# Patient Record
Sex: FEMALE | Race: BLACK OR AFRICAN AMERICAN | URBAN - METROPOLITAN AREA
[De-identification: names, ages, dates, MRNs, and addresses within clinical notes are randomized per-mention and may not be internally consistent; named-entity substitution may affect disease eponyms.]

---

## 2022-10-31 ENCOUNTER — WEB ENCOUNTER (OUTPATIENT)
Dept: URBAN - METROPOLITAN AREA CLINIC 124 | Facility: CLINIC | Age: 27
End: 2022-10-31

## 2022-10-31 ENCOUNTER — OFFICE VISIT (OUTPATIENT)
Dept: URBAN - METROPOLITAN AREA CLINIC 124 | Facility: CLINIC | Age: 27
End: 2022-10-31
Payer: COMMERCIAL

## 2022-10-31 VITALS
HEIGHT: 66 IN | BODY MASS INDEX: 26.39 KG/M2 | DIASTOLIC BLOOD PRESSURE: 66 MMHG | WEIGHT: 164.2 LBS | TEMPERATURE: 96.4 F | HEART RATE: 72 BPM | SYSTOLIC BLOOD PRESSURE: 102 MMHG

## 2022-10-31 DIAGNOSIS — K60.2 FISSURE IN ANO: ICD-10-CM

## 2022-10-31 DIAGNOSIS — K59.04 CHRONIC IDIOPATHIC CONSTIPATION: ICD-10-CM

## 2022-10-31 PROCEDURE — 99204 OFFICE O/P NEW MOD 45 MIN: CPT | Performed by: INTERNAL MEDICINE

## 2022-10-31 RX ORDER — LINACLOTIDE 145 UG/1
1 CAPSULE CAPSULE, GELATIN COATED ORAL ONCE A DAY
Qty: 90 CAPSULE | Refills: 1 | OUTPATIENT
Start: 2022-10-31 | End: 2023-04-29

## 2022-10-31 NOTE — HPI-TODAY'S VISIT:
26 yo fem ref for a GI consultation of chr constipation and a copy will be sent to the ref provider. Pt has had constipation and has had anal tears. Pt has changed her diet, drinks plenty of water, eats high fiber. Pt has tried senna tabs, metamucill, miralax, dulcolax and nothing works. Pt saw a dr at [place]  Pt does not know if she had this as a child. Pt graduated and works in law enforcement. Pt was given prescrption by ER but could not get it filled.

## 2022-11-02 LAB
A/G RATIO: 1.5
ABSOLUTE BASOPHILS: 9
ABSOLUTE EOSINOPHILS: 31
ABSOLUTE LYMPHOCYTES: 1420
ABSOLUTE MONOCYTES: 319
ABSOLUTE NEUTROPHILS: 1321
ALBUMIN: 4.1
ALKALINE PHOSPHATASE: 62
ALT (SGPT): 10
AST (SGOT): 13
BASOPHILS: 0.3
BILIRUBIN, TOTAL: 0.8
BUN/CREATININE RATIO: 9
BUN: 6
CALCIUM: 9
CARBON DIOXIDE, TOTAL: 27
CHLORIDE: 107
CREATININE: 0.7
EGFR: 123
EOSINOPHILS: 1
GLOBULIN, TOTAL: 2.7
GLUCOSE: 85
HEMATOCRIT: 37.9
HEMOGLOBIN: 12.4
HS CRP: 1.4
IMMUNOGLOBULIN A, QN, SERUM: 115
INTERPRETATION: (no result)
LYMPHOCYTES: 45.8
MCH: 28.4
MCHC: 32.7
MCV: 86.9
MONOCYTES: 10.3
MPV: 11.5
NEUTROPHILS: 42.6
PLATELET COUNT: 247
POTASSIUM: 4.1
PROTEIN, TOTAL: 6.8
RDW: 13.1
RED BLOOD CELL COUNT: 4.36
SED RATE BY MODIFIED: 2
SODIUM: 141
T-TRANSGLUTAMINASE (TTG) IGA: <1
WHITE BLOOD CELL COUNT: 3.1

## 2022-12-02 ENCOUNTER — TELEPHONE ENCOUNTER (OUTPATIENT)
Dept: URBAN - METROPOLITAN AREA CLINIC 92 | Facility: CLINIC | Age: 27
End: 2022-12-02

## 2022-12-02 PROBLEM — 82934008: Status: ACTIVE | Noted: 2022-10-31

## 2022-12-02 RX ORDER — LINACLOTIDE 145 UG/1
1 CAPSULE CAPSULE, GELATIN COATED ORAL ONCE A DAY
Qty: 90 CAPSULE | Refills: 0
Start: 2022-10-31 | End: 2023-03-02

## 2022-12-05 ENCOUNTER — OFFICE VISIT (OUTPATIENT)
Dept: URBAN - METROPOLITAN AREA TELEHEALTH 2 | Facility: TELEHEALTH | Age: 27
End: 2022-12-05

## 2022-12-05 VITALS — WEIGHT: 162 LBS | BODY MASS INDEX: 26.03 KG/M2 | HEIGHT: 66 IN

## 2022-12-05 RX ORDER — LINACLOTIDE 145 UG/1
1 CAPSULE CAPSULE, GELATIN COATED ORAL ONCE A DAY
Qty: 90 CAPSULE | Refills: 1 | OUTPATIENT

## 2022-12-05 RX ORDER — LINACLOTIDE 145 UG/1
1 CAPSULE CAPSULE, GELATIN COATED ORAL ONCE A DAY
Qty: 90 CAPSULE | Refills: 1 | Status: ACTIVE | COMMUNITY
Start: 2022-10-31 | End: 2023-04-29

## 2022-12-05 NOTE — HPI-TODAY'S VISIT:
28 yo fem seen in Oct for chronic constipation. Pt has had constipation and has had anal tears. Pt has changed her diet, drinks plenty of water, eats high fiber. Pt has tried senna tabs, metamucill, miralax, dulcolax and nothing works. Given linzess 145 and fissure cream and labs as below

## 2023-06-09 ENCOUNTER — P2P PATIENT RECORD (OUTPATIENT)
Age: 28
End: 2023-06-09

## 2023-07-19 ENCOUNTER — OFFICE VISIT (OUTPATIENT)
Dept: URBAN - METROPOLITAN AREA CLINIC 96 | Facility: CLINIC | Age: 28
End: 2023-07-19

## 2024-01-08 ENCOUNTER — WEB ENCOUNTER (OUTPATIENT)
Dept: URBAN - METROPOLITAN AREA CLINIC 92 | Facility: CLINIC | Age: 29
End: 2024-01-08

## 2024-01-09 ENCOUNTER — TELEPHONE ENCOUNTER (OUTPATIENT)
Dept: URBAN - METROPOLITAN AREA CLINIC 96 | Facility: CLINIC | Age: 29
End: 2024-01-09

## 2024-03-22 ENCOUNTER — TELNP (OUTPATIENT)
Dept: URBAN - METROPOLITAN AREA TELEHEALTH 2 | Facility: TELEHEALTH | Age: 29
End: 2024-03-22

## 2024-03-22 ENCOUNTER — LAB (OUTPATIENT)
Dept: URBAN - METROPOLITAN AREA TELEHEALTH 2 | Facility: TELEHEALTH | Age: 29
End: 2024-03-22

## 2024-03-22 VITALS — HEIGHT: 66 IN | WEIGHT: 162 LBS | BODY MASS INDEX: 26.03 KG/M2

## 2024-03-22 DIAGNOSIS — K62.5 RECTAL BLEEDING: ICD-10-CM

## 2024-03-22 DIAGNOSIS — K59.04 CHRONIC IDIOPATHIC CONSTIPATION: ICD-10-CM

## 2024-03-22 DIAGNOSIS — Z87.19 HISTORY OF ANAL FISSURES: ICD-10-CM

## 2024-03-22 PROCEDURE — 99204 OFFICE O/P NEW MOD 45 MIN: CPT | Performed by: INTERNAL MEDICINE

## 2024-03-22 RX ORDER — LINACLOTIDE 145 UG/1
TAKE ONE CAPSULE BY MOUTH ONE TIME DAILY CAPSULE, GELATIN COATED ORAL
Qty: 30 UNSPECIFIED | Refills: 0 | Status: ACTIVE | COMMUNITY

## 2024-03-22 RX ORDER — ONDANSETRON HYDROCHLORIDE 4 MG/1
2 TABLETS TABLET, FILM COATED ORAL
Qty: 2 TABLETS | Refills: 0 | OUTPATIENT
Start: 2024-03-22

## 2024-03-22 RX ORDER — LINACLOTIDE 145 UG/1
1 CAPSULE AT LEAST 30 MINUTES BEFORE THE FIRST MEAL OF THE DAY ON AN EMPTY STOMACH CAPSULE, GELATIN COATED ORAL ONCE A DAY
Qty: 90 CAPSULES | Refills: 1 | OUTPATIENT
Start: 2024-03-22 | End: 2024-09-18

## 2024-03-22 RX ORDER — SODIUM, POTASSIUM,MAG SULFATES 17.5-3.13G
AS DIRECTED SOLUTION, RECONSTITUTED, ORAL ORAL AS DIRECTED
Qty: 354 ML | Refills: 0 | OUTPATIENT
Start: 2024-03-22 | End: 2024-03-23

## 2024-03-22 RX ORDER — LINACLOTIDE 145 UG/1
1 CAPSULE CAPSULE, GELATIN COATED ORAL ONCE A DAY
Qty: 90 CAPSULE | Refills: 1 | OUTPATIENT

## 2024-03-22 NOTE — HPI-TODAY'S VISIT:
27 yo fem ref by a Dr Beverley Leon in [place] seen in Oct 2022 for chronic constipation. Pt has had constipation and has had anal tears. Pt has changed her diet, drinks plenty of water, eats high fiber. Pt has tried senna tabs, metamucill, miralax, dulcolax and nothing works. Given linzess 145 and fissure cream. Pt did not fu after that time. Pt said linzess worked well. She had run out and immediately back to having constipatoin. Pt using miralax and stimulant laxatives now and they are not working. Pt doesnt get the urge to go. Has been this way when she was 14, got better then in her 20s. Pt has fissures that come and go. She has had rectal bleeding. 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

## 2024-04-17 ENCOUNTER — COLON (OUTPATIENT)
Dept: URBAN - METROPOLITAN AREA SURGERY CENTER 18 | Facility: SURGERY CENTER | Age: 29
End: 2024-04-17
Payer: COMMERCIAL

## 2024-04-17 DIAGNOSIS — K92.1 HEMATOCHEZIA: ICD-10-CM

## 2024-04-17 DIAGNOSIS — K59.09 CHANGE IN BOWEL MOVEMENTS INTERMITTENT CONSTIPATION. URGENCY IN THE MORNING.: ICD-10-CM

## 2024-04-17 PROCEDURE — 45378 DIAGNOSTIC COLONOSCOPY: CPT | Performed by: INTERNAL MEDICINE

## 2024-04-17 PROCEDURE — G8907 PT DOC NO EVENTS ON DISCHARG: HCPCS | Performed by: INTERNAL MEDICINE

## 2024-04-17 RX ORDER — LINACLOTIDE 145 UG/1
TAKE ONE CAPSULE BY MOUTH ONE TIME DAILY CAPSULE, GELATIN COATED ORAL
Qty: 30 UNSPECIFIED | Refills: 0 | Status: ACTIVE | COMMUNITY

## 2024-04-17 RX ORDER — LINACLOTIDE 145 UG/1
1 CAPSULE CAPSULE, GELATIN COATED ORAL ONCE A DAY
Qty: 90 CAPSULE | Refills: 1 | Status: ACTIVE | COMMUNITY

## 2024-04-17 RX ORDER — ONDANSETRON HYDROCHLORIDE 4 MG/1
2 TABLETS TABLET, FILM COATED ORAL
Qty: 2 TABLETS | Refills: 0 | Status: ACTIVE | COMMUNITY
Start: 2024-03-22

## 2024-04-17 RX ORDER — LINACLOTIDE 145 UG/1
1 CAPSULE AT LEAST 30 MINUTES BEFORE THE FIRST MEAL OF THE DAY ON AN EMPTY STOMACH CAPSULE, GELATIN COATED ORAL ONCE A DAY
Qty: 90 CAPSULES | Refills: 1 | Status: ACTIVE | COMMUNITY
Start: 2024-03-22 | End: 2024-09-18

## 2024-05-06 ENCOUNTER — TELEPHONE ENCOUNTER (OUTPATIENT)
Dept: URBAN - METROPOLITAN AREA CLINIC 124 | Facility: CLINIC | Age: 29
End: 2024-05-06

## 2024-05-06 RX ORDER — LINACLOTIDE 145 UG/1
1 CAPSULE CAPSULE, GELATIN COATED ORAL ONCE A DAY
Qty: 90 CAPSULE | Refills: 0